# Patient Record
Sex: MALE | Race: WHITE | NOT HISPANIC OR LATINO | Employment: FULL TIME | URBAN - METROPOLITAN AREA
[De-identification: names, ages, dates, MRNs, and addresses within clinical notes are randomized per-mention and may not be internally consistent; named-entity substitution may affect disease eponyms.]

---

## 2023-03-01 NOTE — PROGRESS NOTES
Assessment:  1  Cervical spinal stenosis    2  Cervical spondylosis        Plan:  Orders Placed This Encounter   Procedures   • FL spine and pain procedure     Standing Status:   Future     Standing Expiration Date:   3/3/2027     Scheduling Instructions:      Charlotte Hoyos to double book     Order Specific Question:   Reason for Exam:     Answer:   C7-T1 АННА     Order Specific Question:   Anticoagulant hold needed? Answer:   No       New Medications Ordered This Visit   Medications   • amLODIPine (NORVASC) 10 mg tablet     Sig: Take 10 mg by mouth daily   • cetirizine (ZyrTEC) 10 mg tablet     Sig: Take 10 mg by mouth daily   • famotidine (PEPCID) 40 MG tablet     Sig: Take 40 mg by mouth daily   • gabapentin (NEURONTIN) 300 mg capsule     Sig: Take 300 mg by mouth 3 (three) times a day   • hydrochlorothiazide (HYDRODIURIL) 25 mg tablet     Sig: Take 25 mg by mouth daily   • ibuprofen (MOTRIN) 800 mg tablet     Sig: take 1 tablet by mouth three times a day for 14 days   • montelukast (SINGULAIR) 10 mg tablet     Sig: Take 10 mg by mouth every evening   • tamsulosin (FLOMAX) 0 4 mg     Sig: take 2 capsules by mouth nightly       My impressions and treatment recommendations were discussed in detail with the patient, who verbalized understanding and had no further questions  This is a 43-year-old male with history of bilateral carpal tunnel syndrome status post bilateral CTS release, bilateral ulnar neuropathy, and chronic bilateral C6 and C7 cervical radiculopathy based on EMG reports to our office chief complaint of neck pain with radiation down the bilateral upper extremities  He has axial neck pain secondary to cervical spondylosis but his main complaint is bilateral arm pain secondary to cervical radiculopathy  We discussed C7-T1 cervical epidural steroid injection help with the symptoms  Also advised he can increase gabapentin to 600 mg 3 times daily  Also benefit from usage of a TENS unit    I also ordered 1 for him today  Once he is complete with injection therapies, he can return to see Dr Nita Gray for reevaluation  [place] Prescription Drug Monitoring Program report was reviewed and was appropriate     Complete risks and benefits including bleeding, infection, tissue reaction, nerve injury and allergic reaction were discussed  The approach was demonstrated using models and literature was provided  Verbal and written consent was obtained  Discharge instructions were provided  I personally saw and examined the patient and I agree with the above discussed plan of care  History of Present Illness:    Mayte Andersen is a 56 y o  male who presents to [facility] for initial evaluation of the above stated pain complaints  The patient has a past medical and chronic pain history as outlined in the assessment section  He was referred by Dr Nita Gray  Is here with chief complaint of neck pain with radiation to the bilateral upper extremities, right greater than left  Patient works as a   Current pain score 7-8 out of 10  Nearly constant  Present all day  Burning, cutting, pins-and-needles, numbness, sharp  Next  Reports subjective weakness of the upper and lower extremities  Reports dropping objects  Next  Pain is increased with standing, bending, sitting, walking, exercise, sneezing  No change in relaxation  No change with sitting  He has MRI of the cervical spine  Report was reviewed in care everywhere  Shows degenerative disc disease throughout  Has multilevel spinal stenosis  There is notable spinal stenosis at the C6-7 level  Hospitalist includes anxiety, hypertension  Reports no relief with physical therapy or exercise in the past   Has never done traction therapy  Current smoker, 1 pack/day for 15 years  No marijuana  No allergy to latex or contrast dye  Currently using Voltaren gel with some relief    Also currently using ibuprofen  He reports recent worsening of weakness in his arms  Pain is in the neck and runs down both arms in a non specific dermatomal distribution  Have history of recent bilateral carpal tunnel release  Also has recent EMG demonstrating bilateral chronic cervical radiculopathy at the C6 and C7 levels  Also has bilateral ulnar neuropathy noted as well  Review of Systems:    Review of Systems   Constitutional: Negative for fever and unexpected weight change  HENT: Negative for trouble swallowing  Eyes: Positive for visual disturbance  Respiratory: Negative for shortness of breath and wheezing  Cardiovascular: Negative for chest pain and palpitations  Gastrointestinal: Negative for constipation, diarrhea, nausea and vomiting  Endocrine: Negative for cold intolerance, heat intolerance and polydipsia  Genitourinary: Negative for difficulty urinating and frequency  Musculoskeletal: Positive for arthralgias, joint swelling and myalgias  Negative for gait problem  Skin: Negative for rash  Neurological: Positive for dizziness and numbness  Negative for seizures, syncope, weakness and headaches  Hematological: Does not bruise/bleed easily  Psychiatric/Behavioral: Negative for dysphoric mood  Anxiety   All other systems reviewed and are negative            Past Medical History:   Diagnosis Date   • Carpal tunnel syndrome     Right   • Glaucoma        Past Surgical History:   Procedure Laterality Date   • CARPAL TUNNEL RELEASE     • EYE SURGERY     • KNEE SURGERY      bilateral       Family History   Problem Relation Age of Onset   • Heart attack Father    • Fibromyalgia Sister        Social History     Occupational History   • Not on file   Tobacco Use   • Smoking status: Every Day     Packs/day: 1 00     Types: Cigarettes   • Smokeless tobacco: Never   Vaping Use   • Vaping Use: Never used   Substance and Sexual Activity   • Alcohol use: Yes     Comment: socially • Drug use: Never   • Sexual activity: Not on file         Current Outpatient Medications:   •  amLODIPine (NORVASC) 10 mg tablet, Take 10 mg by mouth daily, Disp: , Rfl:   •  cetirizine (ZyrTEC) 10 mg tablet, Take 10 mg by mouth daily, Disp: , Rfl:   •  famotidine (PEPCID) 40 MG tablet, Take 40 mg by mouth daily, Disp: , Rfl:   •  gabapentin (NEURONTIN) 300 mg capsule, Take 300 mg by mouth 3 (three) times a day, Disp: , Rfl:   •  hydrochlorothiazide (HYDRODIURIL) 25 mg tablet, Take 25 mg by mouth daily, Disp: , Rfl:   •  ibuprofen (MOTRIN) 800 mg tablet, take 1 tablet by mouth three times a day for 14 days, Disp: , Rfl:   •  montelukast (SINGULAIR) 10 mg tablet, Take 10 mg by mouth every evening, Disp: , Rfl:   •  tamsulosin (FLOMAX) 0 4 mg, take 2 capsules by mouth nightly, Disp: , Rfl:     No Known Allergies    Physical Exam:    /86 (BP Location: Left arm, Patient Position: Sitting, Cuff Size: Standard)   Pulse 97   Ht 5' 8" (1 727 m)   Wt 79 7 kg (175 lb 12 8 oz)   BMI 26 73 kg/m²     Constitutional: normal, well developed, well nourished, alert, in no distress and non-toxic and no overt pain behavior    Eyes: anicteric  HEENT: grossly intact  Neck: supple, symmetric, trachea midline and no masses   Pulmonary:even and unlabored  Cardiovascular:No edema or pitting edema present  Skin:Normal without rashes or lesions and well hydrated  Psychiatric:Mood and affect appropriate  Neurologic:Cranial Nerves II-XII grossly intact  Musculoskeletal:normal     Cervical Spine Exam    Appearance:  Normal lordosis  Palpation/Tenderness:  left cervical paraspinal tenderness  right cervical paraspinal tenderness  Sensory:  no sensory deficits noted  Range of Motion:  Minimally limited in all planes secondary to pain  Motor Strength:  Left Arm Flexion  5/5  Left Arm Extension  5/5  Right Arm Flexion  5/5  Right Arm Extension  4/5  Left Wrist Flexion  4/5  Left Wrist Extension  4/5  Left Finger Abduction  5/5  Right Finger Abduction  5/5  Left Pincer Grasp  5/5  Right Pincer Grasp  5/5  Left    5/5  Right   5/5   Right wrist extension and flexion: 4-5  Bilateral wrist flexion extension limited secondary to pain  There is weakness with right arm extension  Reflexes:  Left Biceps:  2+   Right Biceps:  2+   Left Brachioradialis:  2+   Right Brachioradialis:  2+   Left Triceps:  2+   Right Triceps:  2+   Special Tests:  Left Spurlings:  negative  Right Spurlings  negative  Left Singer's sign  negative  Right Singer's sign  negative        Imaging      10/28/2022  C-spine -- Magnetic Resonance   Neck -- --   MRSPINE -- --     Impression    IMPRESSION:     Motion degraded examination  Diffuse degenerative disease  Multilevel significant spinal stenosis and   neuroforaminal narrowing worse of C3-C4, C5-C6 and C6-C7 levels as described   above  Clinical correlation recommended  [facility][id number]  Narrative    MRI examination of cervical spine     TECHNIQUE: Sagittal T1, T2, STIR, axial gradient echo and spin echo T2-weighted   sequences     HISTORY: Cervical pain     FINDINGS:     The examination is degraded by motion artifact  Alignment: Minimal retrolisthesis of C3-C4  Cervical lordosis is preserved  Vertebrae: Mild marrow edema of the endplates of C3, C4, C5, C6 and C7 with   osteophytes  This is probable degenerative signal change  There is also fatty   marrow signal change of multiple endplates  There appears slight flattening of   superior endplates of T1, T2 and T3 with fatty marrow signal change and   osteophytes  This is likely chronic mild compression  Prevertebral soft tissue: No significant swelling  Cervical cord: Cervical cord is of normal caliber  Evaluation of cord signal is   limited due to motion artifact  There is no convincing abnormal signal of the   cord  C2 disc bulge impressing ventral thecal sac with mild spinal stenosis       C3-C4: Disc space narrowing, disc bulge abutting ventral cord with moderate   spinal stenosis  Bilateral uncovertebral hypertrophy  Mild facet joint   arthrosis  Moderate to severe bilateral neuroforaminal narrowing  C4-C5: Disc bulge and right paracentral disc protrusion slightly impressing   cord  Mild to moderate spinal stenosis  Mild left neuroforaminal narrowing  C5-C6: Disc space narrowing  Disc bulge impressing ventral cord  Moderate to   severe spinal stenosis  Bilateral uncovertebral hypertrophy  Facet joint   arthrosis  Severe bilateral neuroforaminal narrowing worse of right side  C6-C7: Disc space narrowing  Disc bulge abutting ventral cord  Moderate spinal   stenosis  Bilateral uncovertebral hypertrophy  Severe bilateral neuroforaminal   narrowing  C7-T1: Bilateral facet joint arthrosis  Mild right neuroforaminal narrowing  Procedure Note    Leelee Jacobo MD - 10/28/2022   Formatting of this note might be different from the original    MRI examination of cervical spine     TECHNIQUE: Sagittal T1, T2, STIR, axial gradient echo and spin echo T2-weighted   sequences     HISTORY: Cervical pain     FINDINGS:     The examination is degraded by motion artifact  Alignment: Minimal retrolisthesis of C3-C4  Cervical lordosis is preserved  Vertebrae: Mild marrow edema of the endplates of C3, C4, C5, C6 and C7 with   osteophytes  This is probable degenerative signal change  There is also fatty   marrow signal change of multiple endplates  There appears slight flattening of   superior endplates of T1, T2 and T3 with fatty marrow signal change and   osteophytes  This is likely chronic mild compression  Prevertebral soft tissue: No significant swelling  Cervical cord: Cervical cord is of normal caliber  Evaluation of cord signal is   limited due to motion artifact  There is no convincing abnormal signal of the   cord  C2 disc bulge impressing ventral thecal sac with mild spinal stenosis       C3-C4: Disc space narrowing, disc bulge abutting ventral cord with moderate   spinal stenosis  Bilateral uncovertebral hypertrophy  Mild facet joint   arthrosis  Moderate to severe bilateral neuroforaminal narrowing  C4-C5: Disc bulge and right paracentral disc protrusion slightly impressing   cord  Mild to moderate spinal stenosis  Mild left neuroforaminal narrowing  C5-C6: Disc space narrowing  Disc bulge impressing ventral cord  Moderate to   severe spinal stenosis  Bilateral uncovertebral hypertrophy  Facet joint   arthrosis  Severe bilateral neuroforaminal narrowing worse of right side  C6-C7: Disc space narrowing  Disc bulge abutting ventral cord  Moderate spinal   stenosis  Bilateral uncovertebral hypertrophy  Severe bilateral neuroforaminal   narrowing  C7-T1: Bilateral facet joint arthrosis  Mild right neuroforaminal narrowing  IMPRESSION:   IMPRESSION:     Motion degraded examination  Diffuse degenerative disease  Multilevel significant spinal stenosis and   neuroforaminal narrowing worse of C3-C4, C5-C6 and C6-C7 levels as described   above  Clinical correlation recommended            FL spine and pain procedure    (Results Pending)       Orders Placed This Encounter   Procedures   • FL spine and pain procedure

## 2023-03-03 ENCOUNTER — CONSULT (OUTPATIENT)
Dept: PAIN MEDICINE | Facility: CLINIC | Age: 58
End: 2023-03-03

## 2023-03-03 VITALS
DIASTOLIC BLOOD PRESSURE: 86 MMHG | SYSTOLIC BLOOD PRESSURE: 136 MMHG | HEIGHT: 68 IN | BODY MASS INDEX: 26.64 KG/M2 | WEIGHT: 175.8 LBS | HEART RATE: 97 BPM

## 2023-03-03 DIAGNOSIS — M48.02 CERVICAL SPINAL STENOSIS: Primary | ICD-10-CM

## 2023-03-03 DIAGNOSIS — M47.812 CERVICAL SPONDYLOSIS: ICD-10-CM

## 2023-03-03 RX ORDER — CETIRIZINE HYDROCHLORIDE 10 MG/1
10 TABLET ORAL DAILY
COMMUNITY
Start: 2022-12-26

## 2023-03-03 RX ORDER — AMLODIPINE BESYLATE 10 MG/1
10 TABLET ORAL DAILY
COMMUNITY
Start: 2023-01-11

## 2023-03-03 RX ORDER — FAMOTIDINE 40 MG/1
40 TABLET, FILM COATED ORAL DAILY
COMMUNITY
Start: 2023-01-16

## 2023-03-03 RX ORDER — MONTELUKAST SODIUM 10 MG/1
10 TABLET ORAL EVERY EVENING
COMMUNITY
Start: 2023-01-11

## 2023-03-03 RX ORDER — HYDROCHLOROTHIAZIDE 25 MG/1
25 TABLET ORAL DAILY
COMMUNITY
Start: 2022-12-03

## 2023-03-03 RX ORDER — IBUPROFEN 800 MG/1
TABLET ORAL
COMMUNITY
Start: 2023-01-18

## 2023-03-03 RX ORDER — TAMSULOSIN HYDROCHLORIDE 0.4 MG/1
CAPSULE ORAL
COMMUNITY
Start: 2023-01-12

## 2023-03-03 RX ORDER — GABAPENTIN 300 MG/1
300 CAPSULE ORAL 3 TIMES DAILY
COMMUNITY
Start: 2023-01-12

## 2023-03-03 NOTE — PATIENT INSTRUCTIONS
Epidural Steroid Injection   AMBULATORY CARE:   What you need to know about an epidural steroid injection (MELISSA):  An MELISSA is a procedure to inject steroid medicine into the epidural space  The epidural space is between your spinal cord and vertebrae  Steroids reduce inflammation and fluid buildup in your spine that may be causing pain  You may be given pain medicine along with the steroids  How to prepare for an MELISSA:  Your provider will talk to you about how to prepare for your procedure  He or she will tell you what medicines to take or not take on the day of your procedure  You may need to stop taking blood thinners or other medicines several days before your procedure  You may need to adjust any diabetes medicine you take on the day of your procedure  Steroid medicine can increase your blood sugar level  Arrange for someone to drive you home when you are discharged  What will happen during an MELISSA:   You will be given medicine to numb the procedure area  You will be awake for the procedure, but you will not feel pain  You may also be given medicine to help you relax  Contrast liquid will be used to help your provider see the area better  Tell the provider if you have ever had an allergic reaction to contrast liquid  Your provider may place the needle into your neck area, middle of your back, or tailbone area  He or she may inject the medicine next to the nerves that are causing your pain  He or she may instead inject the medicine into a larger area of the epidural space  This helps the medicine spread to more nerves  Your provider will use a fluoroscope to help guide the needle to the right place  A fluoroscope is a type of x-ray  After the procedure, a bandage will be placed over the injection site to prevent infection  What will happen after an MELISSA:  You will have a bandage over the injection site to prevent infection  Your provider will tell you when you can bathe and any activity guidelines   You will be able to go home  Risks of an MELISSA:  You may have temporary or permanent nerve damage or paralysis  You may have bleeding or develop a serious infection, such as meningitis (swelling of the brain coverings)  An abscess may also develop  An abscess is a pus-filled area under the skin  You may need surgery to fix the abscess  You may have a seizure, anxiety, or trouble sleeping  If you are a man, you may have temporary erectile dysfunction (not able to have an erection)  Call your local emergency number (911 in the [address] [facility]) if:   You have a seizure  You have trouble moving your legs  Seek care immediately if:   Blood soaks through your bandage  You have a fever or chills, severe back pain, and the procedure area is sensitive to the touch  You cannot control when you urinate or have a bowel movement  Call your doctor if:   You have weakness or numbness in your legs  Your wound is red, swollen, or draining pus  You have nausea or are vomiting  Your face or neck is red and you feel warm  You have more pain than you had before the procedure  You have swelling in your hands or feet  You have questions or concerns about your condition or care  Care for your wound as directed: You may remove the bandage before you go to bed the day of your procedure  You may take a shower, but do not take a bath for at least 24 hours  Self-care:   Do not drive,  use machines, or do strenuous activity for 24 hours after your procedure or as directed  Continue other treatments  as directed  Steroid injections alone will not control your pain  The injections are meant to be used with other treatments, such as physical therapy  Follow up with your doctor as directed:  Write down your questions so you remember to ask them during your visits  © Copyright [employer] 2022 Information is for End User's use only and may not be sold, redistributed or otherwise used for commercial purposes    The above information is an  only  It is not intended as medical advice for individual conditions or treatments  Talk to your doctor, nurse or pharmacist before following any medical regimen to see if it is safe and effective for you

## 2023-03-06 ENCOUNTER — DOCUMENTATION (OUTPATIENT)
Dept: PAIN MEDICINE | Facility: CLINIC | Age: 58
End: 2023-03-06

## 2023-03-06 NOTE — PROGRESS NOTES
Received email from Emre at [facility]  [facility] and provide [facility]ts with DME euip  Office note faxed to go with DME script as per my conversation with Emre for authorization purposes

## 2023-03-14 ENCOUNTER — TELEPHONE (OUTPATIENT)
Dept: PAIN MEDICINE | Facility: MEDICAL CENTER | Age: 58
End: 2023-03-14

## 2023-03-14 NOTE — TELEPHONE ENCOUNTER
S/W Santhosh Muñoz at [facility] regarding below  States that DME Rx states Cervical traction collar and office notes state TENS unit    Please Clarify order

## 2023-03-14 NOTE — TELEPHONE ENCOUNTER
[facility]    Doctor: janine    Reason for call: traction unit denied   Missing info on ref, if it can be refaxed    FAX# [phone]    Call back#: [phone]

## 2023-03-15 NOTE — TELEPHONE ENCOUNTER
Please refax addended office note to [facility] at fax number below  Prior note was faxed to [facility] per request per documentation

## 2023-03-15 NOTE — TELEPHONE ENCOUNTER
[facility]   Fortino Caicedo

## 2023-03-20 NOTE — TELEPHONE ENCOUNTER
Caller: patient    Doctor: janine    Reason for call: wants to know how to set up the Cervical traction collar    Call back#: [phone]

## 2023-03-21 NOTE — TELEPHONE ENCOUNTER
Per request from [facility], order faxed to Macey Villarreal [id number] with note that Calibration needed